# Patient Record
Sex: FEMALE | Race: WHITE | NOT HISPANIC OR LATINO | Employment: FULL TIME | URBAN - METROPOLITAN AREA
[De-identification: names, ages, dates, MRNs, and addresses within clinical notes are randomized per-mention and may not be internally consistent; named-entity substitution may affect disease eponyms.]

---

## 2017-04-27 ENCOUNTER — OFFICE VISIT (OUTPATIENT)
Dept: MEDICAL GROUP | Facility: LAB | Age: 22
End: 2017-04-27
Payer: COMMERCIAL

## 2017-04-27 VITALS
SYSTOLIC BLOOD PRESSURE: 112 MMHG | TEMPERATURE: 98.5 F | DIASTOLIC BLOOD PRESSURE: 74 MMHG | RESPIRATION RATE: 12 BRPM | OXYGEN SATURATION: 96 % | WEIGHT: 215 LBS | BODY MASS INDEX: 34.55 KG/M2 | HEART RATE: 80 BPM | HEIGHT: 66 IN

## 2017-04-27 DIAGNOSIS — N93.9 ABNORMAL UTERINE BLEEDING: ICD-10-CM

## 2017-04-27 DIAGNOSIS — F90.2 ATTENTION DEFICIT HYPERACTIVITY DISORDER (ADHD), COMBINED TYPE: ICD-10-CM

## 2017-04-27 DIAGNOSIS — F32.1 MODERATE SINGLE CURRENT EPISODE OF MAJOR DEPRESSIVE DISORDER (HCC): ICD-10-CM

## 2017-04-27 DIAGNOSIS — E66.9 OBESITY (BMI 30-39.9): ICD-10-CM

## 2017-04-27 PROCEDURE — 99215 OFFICE O/P EST HI 40 MIN: CPT | Performed by: FAMILY MEDICINE

## 2017-04-27 NOTE — PROGRESS NOTES
Subjective:   Jennifer Castellanos is a 21 y.o. female here today for   Chief Complaint   Patient presents with   • Depression   • ADHD       1. Moderate single current episode of major depressive disorder (CMS-HCC)  This is chronic. Patient's father passed doing 2015 and she has been struggling with this since. We saw her about 5 months ago and prescribed Prozac. She took this for about one month. She overall did not want to be on medication and thus stopped it. She did get tired on the medication. Since that time, she has started to Fail her classes because of this. She is oversleeping. She feels as though the world would be better off Without her. She denies any suicidal plans, but often on the freeway she will have thoughts of wishing somebody hit her so that she does not have to go to work. She has found herself catastrophizing small events in her life. She does relay that there are many things for her to live for including her boyfriend and mother and sister. She is still seeing her counselor regularly. She states that she feels safe    2. Attention deficit hyperactivity disorder (ADHD), combined type  This is chronic. Patient has a prescription for Concerta 36 mg. She has been on this for several years. She has had formal testing for this.    3. Obesity (BMI 30-39.9)  This is chronic. Patient has gained 5 pounds over the last 5 months. She is not exercising regularly due to lack of motivation.    4. Abnormal uterine bleeding  This is a new problem to discuss. She has heavy periods with cramping. She is interested in hormonal contraception. She has been on OCPs when she was 18 which caused severe spotting. She was only on these for 3 months. She would like something to help with her heavy periods. She is interested in IUD..        Current medicines (including changes today)  Current Outpatient Prescriptions   Medication Sig Dispense Refill   • sertraline (ZOLOFT) 50 MG Tab Take 1 Tab by mouth every day. 30 Tab  "5   • methylphenidate (CONCERTA) 36 MG CR tablet Take 1 Tab by mouth every morning. 30 Tab 2     No current facility-administered medications for this visit.     She  has a past medical history of Obesity (BMI 30-39.9).    ROS  No fevers  No bowel changes  No LE edema       Objective:     Blood pressure 112/74, pulse 80, temperature 36.9 °C (98.5 °F), resp. rate 12, height 1.676 m (5' 5.98\"), weight 97.523 kg (215 lb), SpO2 96 %. Body mass index is 34.72 kg/(m^2).   Physical Exam:  Constitutional: Alert, no distress.  Skin: Warm, dry, good turgor, no rashes in visible areas.  Eye: Equal, round and reactive, conjunctiva clear, lids normal.  ENMT: Lips without lesions, good dentition, oropharynx clear.  Neck: Trachea midline, no masses, no thyromegaly. No cervical or supraclavicular lymphadenopathy  Respiratory: Unlabored respiratory effort, lungs clear to auscultation, no wheezes, no ronchi.  Cardiovascular: Normal S1, S2, RRR, no murmur, no edema.  Abdomen: Soft, non-tender, no masses, no hepatosplenomegaly.  Psych: Alert and oriented x3, tearful.        Assessment and Plan:   The following treatment plan was discussed    1. Moderate single current episode of major depressive disorder (CMS-HCC)  Chronic, not controlled  Continue counseling  Start Exercise  Start Zoloft 25 mg for one week then increase to 50 mg   - sertraline (ZOLOFT) 50 MG Tab; Take 1 Tab by mouth every day.  Dispense: 30 Tab; Refill: 5    2. Attention deficit hyperactivity disorder (ADHD), combined type  Chronic, stable, continue Concerta    3. Obesity (BMI 30-39.9)  Chronic, stable, controlled  Hopefully if we treat her depression she motivation to exercise  - Patient identified as having weight management issue.  Appropriate orders and counseling given.    4. Abnormal uterine bleeding  Chronic, stable, patient would like IUD  We will schedule her for Kyleena    Followup: Return for Kyleena placement. and in 6 weeks for depression       This note " was created using voice recognition software. I have made every reasonable attempt to correct errors, however, I do anticipate some grammatical errors.     Total 40 minutes face-to-face time spent with patient not including procedure, with greater than 50% of the total time discussing patient's issues and symptoms as listed above in assessment and plan, as well as managing coordination of care for future evaluation and treatment.

## 2017-04-27 NOTE — Clinical Note
April 27, 2017        Jennifer Castellanos  [id number]  [facility] [address]        To whom it may concern:    Jennifer has been under my care since 11/2016. Due to a medical condition, it is medically necessary for her to withdraw from college courses.     If you have any questions or concerns, please don't hesitate to call.        Sincerely,        Aby Mccoy M.D.    Electronically Signed

## 2017-04-27 NOTE — MR AVS SNAPSHOT
"        Jennifer Castellanos   2017 7:20 AM   Office Visit   MRN: [id number]    Department:  [facility]   Dept Phone:  [phone]    Description:  Female : 1995   Provider:  Aby Mccoy M.D.           Reason for Visit     Depression     ADHD           Allergies as of 2017     No Known Allergies      You were diagnosed with     Moderate single current episode of major depressive disorder (CMS-HCC)   [2798119]       Attention deficit hyperactivity disorder (ADHD), combined type   [3146240]       Obesity (BMI 30-39.9)   [483497]         Vital Signs     Blood Pressure Pulse Temperature Respirations Height Weight    112/74 mmHg 80 36.9 °C (98.5 °F) 12 1.676 m (5' 5.98\") 97.523 kg (215 lb)    Body Mass Index Oxygen Saturation Smoking Status             34.72 kg/m2 96% Never Smoker          Basic Information     Date Of Birth Sex Race Ethnicity Preferred Language    1995 Female White Non- English      Your appointments     2017  7:00 AM   Established Patient with Aby Mccoy M.D.   [facility] (--)    [address] [id number] [phone]           You will be receiving a confirmation call a few days before your appointment from our automated call confirmation system.            2017 11:00 AM   Established Patient with Aby Mccoy M.D.   [facility] (--)    [address] [phone] [phone]           You will be receiving a confirmation call a few days before your appointment from our automated call confirmation system.              Problem List              ICD-10-CM Priority Class Noted - Resolved    Encounter for counseling regarding contraception Z30.9   10/21/2016 - Present    Moderate single current episode of major depressive disorder (CMS-HCC) F32.1   10/21/2016 - Present    Obesity (BMI 30-39.9) E66.9   10/21/2016 - " Present    Attention deficit hyperactivity disorder (ADHD), combined type F90.2   10/21/2016 - Present      Health Maintenance        Date Due Completion Dates    IMM HEP B VACCINE (1 of 3 - Primary Series) 1995 ---    IMM DTaP/Tdap/Td Vaccine (3 - Td) 7/8/2018 7/8/2008, 10/18/2001            Current Immunizations     Dtap Vaccine 10/18/2001    HPV Quadrivalent Vaccine (GARDASIL) 5/21/2013, 6/17/2011, 3/3/2010    Hepatitis A Vaccine, Ped/Adol 7/15/2003, 8/15/2002    IPV 10/18/2001    Influenza Vaccine Quad Inj (Preserved) 10/21/2016    MMR Vaccine 10/18/2001    Meningococcal Conjugate Vaccine MCV4 (Menveo) 1/23/2014, 7/8/2008    Meningococcal Vaccine Serogroup B (Trumenba) 8/18/2015    Tdap Vaccine 7/8/2008    Varicella Vaccine Live 6/17/2011, 8/13/2007      Below and/or attached are the medications your provider expects you to take. Review all of your home medications and newly ordered medications with your provider and/or pharmacist. Follow medication instructions as directed by your provider and/or pharmacist. Please keep your medication list with you and share with your provider. Update the information when medications are discontinued, doses are changed, or new medications (including over-the-counter products) are added; and carry medication information at all times in the event of emergency situations     Allergies:  No Known Allergies          Medications  Valid as of: April 27, 2017 -  8:11 AM    Generic Name Brand Name Tablet Size Instructions for use    Methylphenidate HCl (Tab CR) CONCERTA 36 MG Take 1 Tab by mouth every morning.        Sertraline HCl (Tab) ZOLOFT 50 MG Take 1 Tab by mouth every day.        .                 Medicines prescribed today were sent to:     [facility] [address] [place] [address], [place] - [address] [facility]    [address] [facility] [address]    Phone: [phone] Fax: [phone]    Open 24 Hours?: Yes      Medication refill instructions:       If  your prescription bottle indicates you have medication refills left, it is not necessary to call your provider’s office. Please contact your pharmacy and they will refill your medication.    If your prescription bottle indicates you do not have any refills left, you may request refills at any time through one of the following ways: The online [employer] system (except Urgent Care), by calling your provider’s office, or by asking your pharmacy to contact your provider’s office with a refill request. Medication refills are processed only during regular business hours and may not be available until the next business day. Your provider may request additional information or to have a follow-up visit with you prior to refilling your medication.   *Please Note: Medication refills are assigned a new Rx number when refilled electronically. Your pharmacy may indicate that no refills were authorized even though a new prescription for the same medication is available at the pharmacy. Please request the medicine by name with the pharmacy before contacting your provider for a refill.           [employer] Access Code: Activation code not generated  Current [employer] Status: Active

## 2017-04-28 ENCOUNTER — OFFICE VISIT (OUTPATIENT)
Dept: MEDICAL GROUP | Facility: LAB | Age: 22
End: 2017-04-28
Payer: COMMERCIAL

## 2017-04-28 ENCOUNTER — HOSPITAL ENCOUNTER (OUTPATIENT)
Facility: MEDICAL CENTER | Age: 22
End: 2017-04-28
Attending: FAMILY MEDICINE
Payer: COMMERCIAL

## 2017-04-28 VITALS
HEART RATE: 100 BPM | BODY MASS INDEX: 34.3 KG/M2 | WEIGHT: 213.41 LBS | TEMPERATURE: 97.8 F | RESPIRATION RATE: 16 BRPM | SYSTOLIC BLOOD PRESSURE: 96 MMHG | HEIGHT: 66 IN | DIASTOLIC BLOOD PRESSURE: 70 MMHG

## 2017-04-28 DIAGNOSIS — Z30.430 ENCOUNTER FOR IUD INSERTION: ICD-10-CM

## 2017-04-28 DIAGNOSIS — N94.89 UTERINE CRAMPING: ICD-10-CM

## 2017-04-28 LAB
INT CON NEG: NEGATIVE
INT CON POS: POSITIVE
POC URINE PREGNANCY TEST: NORMAL

## 2017-04-28 PROCEDURE — 58300 INSERT INTRAUTERINE DEVICE: CPT | Performed by: FAMILY MEDICINE

## 2017-04-28 PROCEDURE — 81025 URINE PREGNANCY TEST: CPT | Performed by: FAMILY MEDICINE

## 2017-04-28 RX ORDER — PROMETHAZINE HYDROCHLORIDE 25 MG/1
25 TABLET ORAL ONCE
Status: COMPLETED | OUTPATIENT
Start: 2017-04-28 | End: 2017-04-28

## 2017-04-28 RX ORDER — KETOROLAC TROMETHAMINE 30 MG/ML
60 INJECTION, SOLUTION INTRAMUSCULAR; INTRAVENOUS ONCE
Status: COMPLETED | OUTPATIENT
Start: 2017-04-28 | End: 2017-04-28

## 2017-04-28 RX ADMIN — PROMETHAZINE HYDROCHLORIDE 25 MG: 25 TABLET ORAL at 10:44

## 2017-04-28 RX ADMIN — KETOROLAC TROMETHAMINE 60 MG: 30 INJECTION, SOLUTION INTRAMUSCULAR; INTRAVENOUS at 10:44

## 2017-04-28 NOTE — PROGRESS NOTES
SUBJECTIVE:    Reviewed intake note: yes    Jennifer Castellanos is a  22 y.o. female who presents with the complaint of Abnormal uterine bleeding, need for contraception.    Consent:Counseled regarding use, risks, and benefits of IUD., Patient read and understands the consent booklet., Procedure explained., Signed informed consent obtained.    OBJECTIVE:    Objective results:    Pregnancy test negative, Urine GC/CT collected    abdomen is soft without significant tenderness, masses, organomegaly or guarding., no CVA tenderness., no guarding or rigidity., no hernia noted., no inguinal nodes., no rebound tenderness., pelvic exam: normal external genitalia, vulva, vagina, cervix, uterus and adnexa, nuliparous os, currently on menses    Uterus sounded to 5, Kyleena IUD inserted without difficulty, String visible and trimmed., Patient tolerated procedure but did experience cramping and nausea., for this Toradol 60mg and promethazine 25mg given. She improved after these measures.        Kyleena IUD 19.5mg levonorgestrel  NDC 63760-472-92  Lot [id number]  Exp: 08/18

## 2017-04-28 NOTE — MR AVS SNAPSHOT
"        Jennifer Castellanos   2017 9:20 AM   Office Visit   MRN: [id number]    Department:  [facility]   Dept Phone:  [phone]    Description:  Female : 1995   Provider:  Aby Mccoy M.D.           Allergies as of 2017     No Known Allergies      You were diagnosed with     Encounter for IUD insertion   [424410]         Vital Signs     Blood Pressure Pulse Temperature Respirations Height Weight    96/70 mmHg 100 36.6 °C (97.8 °F) 16 1.676 m (5' 5.98\") 96.8 kg (213 lb 6.5 oz)    Body Mass Index Smoking Status                34.46 kg/m2 Never Smoker           Basic Information     Date Of Birth Sex Race Ethnicity Preferred Language    1995 Female White Non- English      Your appointments     2017 11:00 AM   Established Patient with Aby Mccoy M.D.   [facility] (--)    [address] [phone]           You will be receiving a confirmation call a few days before your appointment from our automated call confirmation system.              Problem List              ICD-10-CM Priority Class Noted - Resolved    Encounter for counseling regarding contraception Z30.9   10/21/2016 - Present    Moderate single current episode of major depressive disorder (CMS-HCC) F32.1   10/21/2016 - Present    Obesity (BMI 30-39.9) E66.9   10/21/2016 - Present    Attention deficit hyperactivity disorder (ADHD), combined type F90.2   10/21/2016 - Present    Abnormal uterine bleeding N93.9   2017 - Present    Encounter for IUD insertion Z30.430   2017 - Present      Health Maintenance        Date Due Completion Dates    IMM HEP B VACCINE (1 of 3 - Primary Series) 1995 ---    IMM DTaP/Tdap/Td Vaccine (3 - Td) 2018, 10/18/2001            Results     POCT Pregnancy      Component Value Standard Range & Units    POC Urine Pregnancy Test NEG Negative    Internal Control Positive " Positive     Internal Control Negative Negative                         Current Immunizations     Dtap Vaccine 10/18/2001    HPV Quadrivalent Vaccine (GARDASIL) 5/21/2013, 6/17/2011, 3/3/2010    Hepatitis A Vaccine, Ped/Adol 7/15/2003, 8/15/2002    IPV 10/18/2001    Influenza Vaccine Quad Inj (Preserved) 10/21/2016    MMR Vaccine 10/18/2001    Meningococcal Conjugate Vaccine MCV4 (Menveo) 1/23/2014, 7/8/2008    Meningococcal Vaccine Serogroup B (Trumenba) 8/18/2015    Tdap Vaccine 7/8/2008    Varicella Vaccine Live 6/17/2011, 8/13/2007      Below and/or attached are the medications your provider expects you to take. Review all of your home medications and newly ordered medications with your provider and/or pharmacist. Follow medication instructions as directed by your provider and/or pharmacist. Please keep your medication list with you and share with your provider. Update the information when medications are discontinued, doses are changed, or new medications (including over-the-counter products) are added; and carry medication information at all times in the event of emergency situations     Allergies:  No Known Allergies          Medications  Valid as of: April 28, 2017 -  9:59 AM    Generic Name Brand Name Tablet Size Instructions for use    Levonorgestrel (IUD) Levonorgestrel 19.5 MG by Intrauterine route.        Methylphenidate HCl (Tab CR) CONCERTA 36 MG Take 1 Tab by mouth every morning.        Sertraline HCl (Tab) ZOLOFT 50 MG Take 1 Tab by mouth every day.        .                 Medicines prescribed today were sent to:     [facility] [address] [place] [address] - [facility] [address] [place] [address] [address] [facility] [address]    Phone: [phone] Fax: [phone]    Open 24 Hours?: Yes      Medication refill instructions:       If your prescription bottle indicates you have medication refills left, it is not necessary to call your provider’s office. Please contact your pharmacy and  they will refill your medication.    If your prescription bottle indicates you do not have any refills left, you may request refills at any time through one of the following ways: The online [employer] system (except Urgent Care), by calling your provider’s office, or by asking your pharmacy to contact your provider’s office with a refill request. Medication refills are processed only during regular business hours and may not be available until the next business day. Your provider may request additional information or to have a follow-up visit with you prior to refilling your medication.   *Please Note: Medication refills are assigned a new Rx number when refilled electronically. Your pharmacy may indicate that no refills were authorized even though a new prescription for the same medication is available at the pharmacy. Please request the medicine by name with the pharmacy before contacting your provider for a refill.        Your To Do List     Future Labs/Procedures Complete By Expires    CHLAMYDIA/GC PCR URINE OR SWAB  As directed 4/29/2018    IUD INSERTION  As directed 4/28/2018         [employer] Access Code: Activation code not generated  Current [employer] Status: Active

## 2017-04-28 NOTE — Clinical Note
April 28, 2017         Patient: Jennifer Castellanos   YOB: 1995   Date of Visit: 4/28/2017           To Whom it May Concern:    Jennifer Castellanos was seen in my clinic on 4/28/2017. Please excuse her for 4/28. She may return to work on 4/29/17..    If you have any questions or concerns, please don't hesitate to call.        Sincerely,           Aby Mccoy M.D.  Electronically Signed

## 2017-06-15 ENCOUNTER — OFFICE VISIT (OUTPATIENT)
Dept: MEDICAL GROUP | Facility: LAB | Age: 22
End: 2017-06-15
Payer: COMMERCIAL

## 2017-06-15 VITALS
HEIGHT: 66 IN | BODY MASS INDEX: 35.64 KG/M2 | DIASTOLIC BLOOD PRESSURE: 72 MMHG | OXYGEN SATURATION: 98 % | SYSTOLIC BLOOD PRESSURE: 90 MMHG | TEMPERATURE: 98.1 F | HEART RATE: 68 BPM | WEIGHT: 221.78 LBS | RESPIRATION RATE: 16 BRPM

## 2017-06-15 DIAGNOSIS — Z30.431 IUD CHECK UP: ICD-10-CM

## 2017-06-15 DIAGNOSIS — N93.9 ABNORMAL UTERINE BLEEDING: ICD-10-CM

## 2017-06-15 DIAGNOSIS — E66.9 OBESITY (BMI 30-39.9): ICD-10-CM

## 2017-06-15 DIAGNOSIS — F32.1 MODERATE SINGLE CURRENT EPISODE OF MAJOR DEPRESSIVE DISORDER (HCC): ICD-10-CM

## 2017-06-15 PROBLEM — Z30.430 ENCOUNTER FOR IUD INSERTION: Status: RESOLVED | Noted: 2017-04-28 | Resolved: 2017-06-15

## 2017-06-15 PROCEDURE — 99214 OFFICE O/P EST MOD 30 MIN: CPT | Performed by: FAMILY MEDICINE

## 2017-06-15 NOTE — MR AVS SNAPSHOT
"        Jennifer Castellanos   6/15/2017 8:40 AM   Office Visit   MRN: [id number]    Department:  [facility]   Dept Phone:  [phone]    Description:  Female : 1995   Provider:  Aby Mccoy M.D.           Reason for Visit     Follow-Up IUD CHECK       Allergies as of 6/15/2017     No Known Allergies      You were diagnosed with     Moderate single current episode of major depressive disorder (CMS-HCC)   [7641291]       Abnormal uterine bleeding   [014483]       Obesity (BMI 30-39.9)   [499919]         Vital Signs     Blood Pressure Pulse Temperature Respirations Height Weight    90/72 mmHg 68 36.7 °C (98.1 °F) 16 1.676 m (5' 5.98\") 100.6 kg (221 lb 12.5 oz)    Body Mass Index Oxygen Saturation Smoking Status             35.81 kg/m2 98% Never Smoker          Basic Information     Date Of Birth Sex Race Ethnicity Preferred Language    1995 Female White Non- English      Your appointments     Dec 15, 2017  9:00 AM   Established Patient with Aby Mccoy M.D.   [facility] (--)    [address] [address] [phone]           You will be receiving a confirmation call a few days before your appointment from our automated call confirmation system.              Problem List              ICD-10-CM Priority Class Noted - Resolved    Moderate single current episode of major depressive disorder (CMS-[facility]) F32.1   10/21/2016 - Present    Obesity (BMI 30-39.9) E66.9   10/21/2016 - Present    Attention deficit hyperactivity disorder (ADHD), combined type F90.2   10/21/2016 - Present    Abnormal uterine bleeding N93.9   2017 - Present      Health Maintenance        Date Due Completion Dates    IMM HEP B VACCINE (1 of 3 - Primary Series) 1995 ---    IMM DTaP/Tdap/Td Vaccine (3 - Td) 2018, 10/18/2001            Current Immunizations     Dtap Vaccine 10/18/2001    HPV Quadrivalent Vaccine (GARDASIL) " 5/21/2013, 6/17/2011, 3/3/2010    Hepatitis A Vaccine, Ped/Adol 7/15/2003, 8/15/2002    IPV 10/18/2001    Influenza Vaccine Quad Inj (Preserved) 10/21/2016    MMR Vaccine 10/18/2001    Meningococcal Conjugate Vaccine MCV4 (Menveo) 1/23/2014, 7/8/2008    Meningococcal Vaccine Serogroup B (Trumenba) 8/18/2015    Tdap Vaccine 7/8/2008    Varicella Vaccine Live 6/17/2011, 8/13/2007      Below and/or attached are the medications your provider expects you to take. Review all of your home medications and newly ordered medications with your provider and/or pharmacist. Follow medication instructions as directed by your provider and/or pharmacist. Please keep your medication list with you and share with your provider. Update the information when medications are discontinued, doses are changed, or new medications (including over-the-counter products) are added; and carry medication information at all times in the event of emergency situations     Allergies:  No Known Allergies          Medications  Valid as of: Tamara 15, 2017 -  9:10 AM    Generic Name Brand Name Tablet Size Instructions for use    Levonorgestrel (IUD) Levonorgestrel 19.5 MG by Intrauterine route.        Methylphenidate HCl (Tab CR) CONCERTA 36 MG Take 1 Tab by mouth every morning.        Sertraline HCl (Tab) ZOLOFT 50 MG Take 1 Tab by mouth every day.        .                 Medicines prescribed today were sent to:     [facility] [address] [place] [address] [address] [facility] & [place] [address] [address] [facility] [address]    Phone: [phone] Fax: [phone]    Open 24 Hours?: Yes      Medication refill instructions:       If your prescription bottle indicates you have medication refills left, it is not necessary to call your provider’s office. Please contact your pharmacy and they will refill your medication.    If your prescription bottle indicates you do not have any refills left, you may request refills at any time through one of  the following ways: The online [employer] system (except Urgent Care), by calling your provider’s office, or by asking your pharmacy to contact your provider’s office with a refill request. Medication refills are processed only during regular business hours and may not be available until the next business day. Your provider may request additional information or to have a follow-up visit with you prior to refilling your medication.   *Please Note: Medication refills are assigned a new Rx number when refilled electronically. Your pharmacy may indicate that no refills were authorized even though a new prescription for the same medication is available at the pharmacy. Please request the medicine by name with the pharmacy before contacting your provider for a refill.           [employer] Access Code: Activation code not generated  Current [employer] Status: Active

## 2017-06-15 NOTE — PROGRESS NOTES
"Subjective:   Jennifer Castellanos is a 22 y.o. female here today for   Chief Complaint   Patient presents with   • Follow-Up     IUD CHECK        1. Moderate single current episode of major depressive disorder (CMS-HCC)  Chronic. At the last visit, started zoloft 50mg daily. She is feeling much better. She states it is much easier to leave her apartment. She is less anxious. She is feeling better about herself. She denies any side effects from this medication. She is not currently in school and so she is anxious to see how the medications work when she restarts school back in the fall    2. Abnormal uterine bleeding/IUD Checkup  Kyleena inserted 6 weeks ago. Did have about 1 month of uterine cramping. She did have 1 period that was very heavy and painful. She is overall feeling much better. She is spotting currently.     3. Obesity (BMI 30-39.9)  Chronic. Has gained 8lbs over the last 6 weeks. She stopped going to the gym because this is how she was relieving her anxiety. Now she does not need to relieve her anxiety so she stopped going.    Current medicines (including changes today)  Current Outpatient Prescriptions   Medication Sig Dispense Refill   • sertraline (ZOLOFT) 50 MG Tab Take 1 Tab by mouth every day. 90 Tab 3   • Levonorgestrel (KYLEENA) 19.5 MG IUD by Intrauterine route.     • methylphenidate (CONCERTA) 36 MG CR tablet Take 1 Tab by mouth every morning. 30 Tab 2     No current facility-administered medications for this visit.     She  has a past medical history of Obesity (BMI 30-39.9).    ROS   No fevers  No bowel changes  No LE edema       Objective:     Blood pressure 90/72, pulse 68, temperature 36.7 °C (98.1 °F), resp. rate 16, height 1.676 m (5' 5.98\"), weight 100.6 kg (221 lb 12.5 oz), SpO2 98 %. Body mass index is 35.81 kg/(m^2).   Physical Exam:  Constitutional: Alert, no distress.  Skin: Warm, dry, good turgor, no rashes in visible areas.  Eye: Equal, round and reactive, conjunctiva clear, " lids normal.  ENMT: Lips without lesions, good dentition, oropharynx clear.  Neck: Trachea midline, no masses, no thyromegaly. No cervical or supraclavicular lymphadenopathy  Respiratory: Unlabored respiratory effort, lungs clear to auscultation, no wheezes, no ronchi.  Cardiovascular: Normal S1, S2, RRR, no murmur, no edema.  Abdomen: Soft, non-tender, no masses, no hepatosplenomegaly.  Psych: Alert and oriented x3, normal affect and mood.  : Speculum exam vaginal mucosa, mild vaginal bleeding from the cervical os, strings visualized at the office. Bimanual exam without any cervical motion tenderness    Assessment and Plan:   The following treatment plan was discussed    1. Moderate single current episode of major depressive disorder (CMS-HCC)  Chronic, stable  Continue Zoloft 50 mg daily  Discussed counseling again, patient will look into this  Follow up every 6 months    2. Abnormal uterine bleeding/IUD checkup  Chronic, stable  Strings visualized today  Discussed normal course of regulating periods with Kyleena IUD   Return yearly, sooner if any concerns    3. Obesity (BMI 30-39.9)  Chronic, worsening, discussed importance of dietary modifications and exercise      Followup: Return in about 6 months (around 12/15/2017) for depression .       This note was created using voice recognition software. I have made every reasonable attempt to correct errors, however, I do anticipate some grammatical errors.

## 2018-01-16 ENCOUNTER — APPOINTMENT (OUTPATIENT)
Dept: MEDICAL GROUP | Facility: LAB | Age: 23
End: 2018-01-16
Payer: COMMERCIAL

## 2019-03-27 ENCOUNTER — HOSPITAL ENCOUNTER (OUTPATIENT)
Facility: MEDICAL CENTER | Age: 24
End: 2019-03-27
Attending: FAMILY MEDICINE
Payer: COMMERCIAL

## 2019-03-27 ENCOUNTER — OFFICE VISIT (OUTPATIENT)
Dept: MEDICAL GROUP | Facility: LAB | Age: 24
End: 2019-03-27
Payer: COMMERCIAL

## 2019-03-27 ENCOUNTER — TELEPHONE (OUTPATIENT)
Dept: MEDICAL GROUP | Facility: LAB | Age: 24
End: 2019-03-27

## 2019-03-27 VITALS
TEMPERATURE: 98.1 F | HEIGHT: 67 IN | RESPIRATION RATE: 16 BRPM | SYSTOLIC BLOOD PRESSURE: 118 MMHG | HEART RATE: 95 BPM | DIASTOLIC BLOOD PRESSURE: 72 MMHG | BODY MASS INDEX: 33.09 KG/M2 | OXYGEN SATURATION: 96 % | WEIGHT: 210.8 LBS

## 2019-03-27 DIAGNOSIS — Z12.4 SCREENING FOR MALIGNANT NEOPLASM OF CERVIX: ICD-10-CM

## 2019-03-27 DIAGNOSIS — F32.1 MODERATE SINGLE CURRENT EPISODE OF MAJOR DEPRESSIVE DISORDER (HCC): ICD-10-CM

## 2019-03-27 DIAGNOSIS — D36.7 CYST, DERMOID, ARM, RIGHT: ICD-10-CM

## 2019-03-27 DIAGNOSIS — Z00.00 HEALTH MAINTENANCE EXAMINATION: ICD-10-CM

## 2019-03-27 DIAGNOSIS — F41.9 ANXIETY: ICD-10-CM

## 2019-03-27 DIAGNOSIS — E66.9 OBESITY (BMI 30-39.9): ICD-10-CM

## 2019-03-27 DIAGNOSIS — Z11.51 SCREENING FOR HPV (HUMAN PAPILLOMAVIRUS): ICD-10-CM

## 2019-03-27 DIAGNOSIS — Z11.3 SCREEN FOR STD (SEXUALLY TRANSMITTED DISEASE): ICD-10-CM

## 2019-03-27 DIAGNOSIS — Z01.419 WELL WOMAN EXAM WITH ROUTINE GYNECOLOGICAL EXAM: ICD-10-CM

## 2019-03-27 DIAGNOSIS — Z13.220 ENCOUNTER FOR LIPID SCREENING FOR CARDIOVASCULAR DISEASE: ICD-10-CM

## 2019-03-27 DIAGNOSIS — Z13.6 ENCOUNTER FOR LIPID SCREENING FOR CARDIOVASCULAR DISEASE: ICD-10-CM

## 2019-03-27 DIAGNOSIS — N60.02 CYST OF LEFT BREAST: ICD-10-CM

## 2019-03-27 PROCEDURE — 99214 OFFICE O/P EST MOD 30 MIN: CPT | Mod: 25 | Performed by: FAMILY MEDICINE

## 2019-03-27 PROCEDURE — 99395 PREV VISIT EST AGE 18-39: CPT | Performed by: FAMILY MEDICINE

## 2019-03-27 PROCEDURE — 99000 SPECIMEN HANDLING OFFICE-LAB: CPT | Performed by: FAMILY MEDICINE

## 2019-03-27 RX ORDER — CITALOPRAM 20 MG/1
TABLET ORAL
Qty: 30 TAB | Refills: 5 | Status: SHIPPED | OUTPATIENT
Start: 2019-03-27 | End: 2019-11-15 | Stop reason: SDUPTHER

## 2019-03-27 ASSESSMENT — PATIENT HEALTH QUESTIONNAIRE - PHQ9
SUM OF ALL RESPONSES TO PHQ9 QUESTIONS 1 AND 2: 3
9. THOUGHTS THAT YOU WOULD BE BETTER OFF DEAD, OR OF HURTING YOURSELF: NOT AT ALL
SUM OF ALL RESPONSES TO PHQ QUESTIONS 1-9: 11
5. POOR APPETITE OR OVEREATING: NOT AT ALL
1. LITTLE INTEREST OR PLEASURE IN DOING THINGS: SEVERAL DAYS
6. FEELING BAD ABOUT YOURSELF - OR THAT YOU ARE A FAILURE OR HAVE LET YOURSELF OR YOUR FAMILY DOWN: MORE THAN HALF THE DAYS
7. TROUBLE CONCENTRATING ON THINGS, SUCH AS READING THE NEWSPAPER OR WATCHING TELEVISION: MORE THAN HALF THE DAYS
3. TROUBLE FALLING OR STAYING ASLEEP OR SLEEPING TOO MUCH: MORE THAN HALF THE DAYS
8. MOVING OR SPEAKING SO SLOWLY THAT OTHER PEOPLE COULD HAVE NOTICED. OR THE OPPOSITE, BEING SO FIGETY OR RESTLESS THAT YOU HAVE BEEN MOVING AROUND A LOT MORE THAN USUAL: NOT AT ALL
2. FEELING DOWN, DEPRESSED, IRRITABLE, OR HOPELESS: MORE THAN HALF THE DAYS
4. FEELING TIRED OR HAVING LITTLE ENERGY: MORE THAN HALF THE DAYS

## 2019-03-27 NOTE — TELEPHONE ENCOUNTER
[facility] [phone]  pharmacy needs clarification, usually a person only takes one of these medications. You sent in both.   citalopram (CELEXA) 20 MG Tab and sertraline (ZOLOFT) 50 MG Tab   Is pt taking both?

## 2019-03-28 LAB
FORWARD REASON: SPWHY: NORMAL
FORWARDED TO LAB: SPWHR: NORMAL
SPECIMEN SENT: SPWT1: NORMAL

## 2019-03-28 NOTE — PROGRESS NOTES
Subjective:   Jennifer Castellanos is a 23 y.o. female here today for   Chief Complaint   Patient presents with   • Gynecologic Exam     1. Well woman exam with routine gynecological exam  2. Screening for malignant neoplasm of cervix  3. Screening for HPV (human papillomavirus)  Patient is here today for her first Pap smear.  She is using the KylLeena IUD for birth control.  She is currently sexually active with one partner for the last 8 months, male, not using regular protection.  She has never been screened a full panel of STDs.  She has had gonorrhea and chlamydia testing in the past.  She is staying active, believes she is eating a healthy diet.    4. Cyst, dermoid, arm, right  This is a new problem to discuss with me today.  Patient reports an area on the right arm that is been growing, slightly tender, and feels nodular under the skin.  No draining.  No current fevers, chills.  Not currently tender but it has been in the past.  She states that it can double in size and then go back down    5. Moderate single current episode of major depressive disorder (HCC)  6. Anxiety  Chronic.  Patient has been on Zoloft in the past.  She was on this for the last couple of years but stopped this about 3 months ago.  She started using cannabis instead.  She states that this helps her sleep, but has noticed that her anxiety has much worsened.  Depression symptoms are stable off of the medication.  No suicidal ideation.  Energy levels are lower.  She states that when she was on the Zoloft, she overall felt a lot less depressed but also had a difficult time regulating her emotions    7. Obesity (BMI 30-39.9)  This is chronic.  States that she is active regularly.  Diet overall healthy    8. Cyst of left breast  New to discuss.  Patient reports a lesion superior to the left nipple that drains a milky or clear fluid occasionally.  This is been going on for 6 months.  It is a linear.  She drained it last night.  She does feel  "nodule underneath it when it is full.        Current medicines (including changes today)  Current Outpatient Prescriptions   Medication Sig Dispense Refill   • citalopram (CELEXA) 20 MG Tab Take 1/2 tab PO Daily for 5 days then increase to the full tab daily 30 Tab 5   • Levonorgestrel (KYLEENA) 19.5 MG IUD by Intrauterine route.     • methylphenidate (CONCERTA) 36 MG CR tablet Take 1 Tab by mouth every morning. 30 Tab 2     No current facility-administered medications for this visit.      She  has a past medical history of Anxiety (3/27/2019) and Obesity (BMI 30-39.9).    ROS   No fevers  No bowel changes  No LE edema       Objective:     Blood pressure 118/72, pulse 95, temperature 36.7 °C (98.1 °F), temperature source Temporal, resp. rate 16, height 1.689 m (5' 6.5\"), weight 95.6 kg (210 lb 12.8 oz), last menstrual period 03/11/2019, SpO2 96 %. Body mass index is 33.51 kg/m².   Physical Exam:  Constitutional: Alert, no distress.  Skin: Warm, dry, good turgor, no rashes in visible areas. 1cm firm nodule in the right arm without fluctuance   Eye: Equal, round and reactive, conjunctiva clear, lids normal.  ENMT: Lips without lesions, good dentition, oropharynx clear.  Neck: Trachea midline, no masses, no thyromegaly. No cervical or supraclavicular lymphadenopathy  Respiratory: Unlabored respiratory effort, lungs clear to auscultation, no wheezes, no ronchi.  Cardiovascular: Normal S1, S2, RRR, no murmur, no edema.  Abdomen: Soft, non-tender, no masses, no hepatosplenomegaly.  Psych: Alert and oriented x3, normal affect and mood.  PELVIC:Perineum and external genitalia normal without rash. Vagina with normal and physiologic discharge. Cervix without visible lesions or discharge. Bimanual exam without adnexal masses or cervical motion tenderness. IUD strings visualized  BREAST EXAM: No skin changes, peau d'orange or nipple retraction. No discharge. No axillary or supraclavicular adenopathy. Linear 1cm firm mobile " "lesion on the left areola, unable to express discharge        Assessment and Plan:   The following treatment plan was discussed    1. Well woman exam with routine gynecological exam  2. Screening for malignant neoplasm of cervix  3. Screening for HPV (human papillomavirus)  - THINPREP RFLX HPV ASCUS W/CTNG; Future    4. Cyst, dermoid, arm, right  This is a new problem, suspect cyst given the fluctuating size.  At this point if it is not bothering her we will leave it.  If it continues then we will have her come back for excision have her see surgery  - PANEL [id number]  - RPR  - CBC WITH DIFFERENTIAL; Future  - Comp Metabolic Panel; Future  - Lipid Profile; Future  - TSH WITH REFLEX TO FT4; Future    5. Moderate single current episode of major depressive disorder (HCC)  Chronic, uncontrolled.  Discussed risks of marijuana use in depression and anxiety.  She is currently self-medicating with that.  She was having a \"numb\" feeling from Zoloft.  We are going to change to Celexa.  If no improvement then we will consider SNRI.  Discussed counseling and she is going to look into this with her insurance.  She was doing counseling at the [facility] previously  - citalopram (CELEXA) 20 MG Tab; Take 1/2 tab PO Daily for 5 days then increase to the full tab daily  Dispense: 30 Tab; Refill: 5  - PANEL [id number]  - RPR  - CBC WITH DIFFERENTIAL; Future  - Comp Metabolic Panel; Future  - Lipid Profile; Future  - TSH WITH REFLEX TO FT4; Future    6. Anxiety  Chronic, controlled, see #5  - citalopram (CELEXA) 20 MG Tab; Take 1/2 tab PO Daily for 5 days then increase to the full tab daily  Dispense: 30 Tab; Refill: 5  - PANEL [id number]  - RPR  - CBC WITH DIFFERENTIAL; Future  - Comp Metabolic Panel; Future  - Lipid Profile; Future  - TSH WITH REFLEX TO FT4; Future    7. Obesity (BMI 30-39.9)  Chronic, weight is stable.  Discussed vigorous exercise, healthy diet  - Patient identified as having weight management issue.  Appropriate orders and " counseling given.  - PANEL [id number]  - RPR  - CBC WITH DIFFERENTIAL; Future  - Comp Metabolic Panel; Future  - Lipid Profile; Future  - TSH WITH REFLEX TO FT4; Future    8. Cyst of left breast  This is a new problem, ultrasound with mammography ordered for further investigation  - MA-UNILAT DIAGNOSTIC MAMMO W/ CAD LEFT; Future    9. Screen for STD (sexually transmitted disease)  - PANEL [id number]  - RPR  - CBC WITH DIFFERENTIAL; Future  - Comp Metabolic Panel; Future  - Lipid Profile; Future  - TSH WITH REFLEX TO FT4; Future    10. Encounter for lipid screening for cardiovascular disease  - PANEL [id number]  - RPR  - CBC WITH DIFFERENTIAL; Future  - Comp Metabolic Panel; Future  - Lipid Profile; Future  - TSH WITH REFLEX TO FT4; Future    11. Health maintenance examination  - PANEL [id number]  - RPR  - CBC WITH DIFFERENTIAL; Future  - Comp Metabolic Panel; Future  - Lipid Profile; Future  - TSH WITH REFLEX TO FT4; Future      Followup: Return in about 6 weeks (around 5/8/2019) for depression, anxiety .       This note was created using voice recognition software. I have made every reasonable attempt to correct errors, however, I do anticipate some grammatical errors.

## 2019-11-15 DIAGNOSIS — F41.9 ANXIETY: ICD-10-CM

## 2019-11-15 DIAGNOSIS — F32.1 MODERATE SINGLE CURRENT EPISODE OF MAJOR DEPRESSIVE DISORDER (HCC): ICD-10-CM

## 2019-11-15 RX ORDER — CITALOPRAM 20 MG/1
TABLET ORAL
Qty: 30 TAB | Refills: 3 | Status: SHIPPED | OUTPATIENT
Start: 2019-11-15 | End: 2020-04-09

## 2019-11-15 NOTE — TELEPHONE ENCOUNTER
Was the patient seen in the last year in this department? Yes  3/27/19  Does patient have an active prescription for medications requested? No     Received Request Via: Pharmacy

## 2020-04-09 DIAGNOSIS — F32.1 MODERATE SINGLE CURRENT EPISODE OF MAJOR DEPRESSIVE DISORDER (HCC): ICD-10-CM

## 2020-04-09 DIAGNOSIS — F41.9 ANXIETY: ICD-10-CM

## 2020-04-09 RX ORDER — CITALOPRAM 20 MG/1
TABLET ORAL
Qty: 30 TAB | Refills: 0 | Status: SHIPPED | OUTPATIENT
Start: 2020-04-09 | End: 2020-04-10 | Stop reason: SDUPTHER

## 2020-04-09 NOTE — TELEPHONE ENCOUNTER
Received request via: Patient    Was the patient seen in the last year in this department? No LOV 3/27/19 I HAVE A RX QUED UP AS A 30 DAY SUPPLY. I'M ASKING PATIENT TO REESTABLISH FOR FUTURE REFILLS.    Does the patient have an active prescription (recently filled or refills available) for medication(s) requested? No

## 2020-04-10 ENCOUNTER — TELEMEDICINE (OUTPATIENT)
Dept: MEDICAL GROUP | Facility: LAB | Age: 25
End: 2020-04-10
Payer: COMMERCIAL

## 2020-04-10 VITALS — HEART RATE: 76 BPM | BODY MASS INDEX: 29.89 KG/M2 | HEIGHT: 66 IN | RESPIRATION RATE: 12 BRPM | WEIGHT: 186 LBS

## 2020-04-10 DIAGNOSIS — F32.1 MODERATE SINGLE CURRENT EPISODE OF MAJOR DEPRESSIVE DISORDER (HCC): ICD-10-CM

## 2020-04-10 DIAGNOSIS — F90.2 ATTENTION DEFICIT HYPERACTIVITY DISORDER (ADHD), COMBINED TYPE: ICD-10-CM

## 2020-04-10 DIAGNOSIS — F41.9 ANXIETY: ICD-10-CM

## 2020-04-10 PROCEDURE — 99213 OFFICE O/P EST LOW 20 MIN: CPT | Mod: CR,95 | Performed by: FAMILY MEDICINE

## 2020-04-10 RX ORDER — CITALOPRAM 20 MG/1
20 TABLET ORAL DAILY
Qty: 90 TAB | Refills: 3 | Status: SHIPPED | OUTPATIENT
Start: 2020-04-10 | End: 2021-04-12

## 2020-04-10 NOTE — PROGRESS NOTES
Telemedicine Visit: New Patient     This encounter was conducted via Zoom .   Verbal consent was obtained. Patient's identity was verified.    Subjective:     CC: Med refills    Jennifer Castellanos is a 24 y.o. female presenting to establish care and to discuss the evaluation and management of    Anxiety and depression  Chronic issues, stable.  Has been on stable dose of Celexa for 1 year.  No concerns with mood and feels as well controlled.  No thoughts of self-harm    ADHD  Chronic issue.  Had previously been on Concerta when she was going to school.  Has been off of this for 3 years that she has not been going to school.  She is going back to school is following up to restart.  Reports she did tolerate Concerta well in the past.  Did notice minor issue with sleeping but this improved with taking in the early morning.  No issues with appetite or agitation.    ROS  See HPI  Constitutional: Negative for fever, chills and malaise/fatigue.   HENT: Negative for congestion.    Eyes: Negative for pain.   Respiratory: Negative for cough and shortness of breath.    Cardiovascular: Negative for leg swelling.   Gastrointestinal: Negative for nausea, vomiting, abdominal pain and diarrhea.   Genitourinary: Negative for dysuria and hematuria.   Skin: Negative for rash.   Neurological: Negative for dizziness, focal weakness and headaches.   Endo/Heme/Allergies: Does not bruise/bleed easily.   Psychiatric/Behavioral: Negative for depression.  The patient is not nervous/anxious.      No Known Allergies    Current medicines (including changes today)  Current Outpatient Medications   Medication Sig Dispense Refill   • citalopram (CELEXA) 20 MG Tab Take 1 Tab by mouth every day. 90 Tab 3   • Levonorgestrel (KYLEENA) 19.5 MG IUD by Intrauterine route.     • methylphenidate (CONCERTA) 36 MG CR tablet Take 1 Tab by mouth every morning. (Patient not taking: Reported on 4/10/2020) 30 Tab 2     No current facility-administered medications  "for this visit.        She  has a past medical history of Anxiety (3/27/2019) and Obesity (BMI 30-39.9).  She  has no past surgical history on file.      Family History   Problem Relation Age of Onset   • Heart Disease Father    • Diabetes Maternal Grandmother    • Heart Disease Maternal Grandmother    • Hypertension Maternal Grandmother    • Heart Disease Maternal Grandfather      Family Status   Relation Name Status   • Mo  Alive   • Fa     • MGMo  (Not Specified)   • MGFa  (Not Specified)       Patient Active Problem List    Diagnosis Date Noted   • Anxiety 2019   • Abnormal uterine bleeding 2017   • Moderate single current episode of major depressive disorder (HCC) 10/21/2016   • Obesity (BMI 30-39.9) 10/21/2016   • Attention deficit hyperactivity disorder (ADHD), combined type 10/21/2016          Objective:   Vitals obtained by patient:  Pulse 76   Resp 12   Ht 1.676 m (5' 6\")   Wt 84.4 kg (186 lb)   BMI 30.02 kg/m²     Physical Exam:  Constitutional: Alert, no distress, well-groomed.  Skin: No rashes in visible areas.  Eye: Round. Conjunctiva clear, lids normal. No icterus.   ENMT: Lips pink without lesions, good dentition, moist mucous membranes. Phonation normal.  Neck: No masses, no thyromegaly. Moves freely without pain.  CV: Pulse as reported by patient  Respiratory: Unlabored respiratory effort, no cough or audible wheeze  Psych: Alert and oriented x3, normal affect and mood.       Assessment and Plan:   The following treatment plan was discussed:     1. Anxiety  2. Moderate single current episode of major depressive disorder (HCC)  Chronic, stable.  Currently well controlled.  Refilled Celexa  - citalopram (CELEXA) 20 MG Tab; Take 1 Tab by mouth every day.  Dispense: 90 Tab; Refill: 3    3. Attention deficit hyperactivity disorder (ADHD), combined type  Chronic issue. Has been off of Concerta for 3 years that she stopped going to school.  She is restarting school in the fall with " restarting Concerta.  Did tolerate as well in the past.  Collect UDS and contract and plan to restart this again prior to her starting school.    Follow-up: Return in about 3 months (around 7/10/2020).

## 2021-04-12 ENCOUNTER — TELEPHONE (OUTPATIENT)
Dept: MEDICAL GROUP | Facility: LAB | Age: 26
End: 2021-04-12

## 2022-04-20 ENCOUNTER — TELEPHONE (OUTPATIENT)
Dept: HEALTH INFORMATION MANAGEMENT | Facility: OTHER | Age: 27
End: 2022-04-20

## 2022-04-20 DIAGNOSIS — Z97.5 IUD (INTRAUTERINE DEVICE) IN PLACE: ICD-10-CM

## 2022-04-20 NOTE — TELEPHONE ENCOUNTER
1. Caller Name: Jennifer Castellanos                          Call Back Number: [phone] (home)         How would the patient prefer to be contacted with a response: [employer] message    2. SPECIFIC Action To Be Taken: Referral pending, please sign.    3. Diagnosis/Clinical Reason for Request: IUD replacement     4. Specialty & Provider Name/Lab/Imaging Location: [facility]    5. Is appointment scheduled for requested order/referral: yes - 4.28.2022    Patient was not informed they will receive a return phone call from the office ONLY if there are any questions before processing their request. Advised to call back if they haven't received a call from the referral department in 5 days.         MY Chart Message:    Jennifer Castellanos would like to request a referral.  Reason: Pap Smear / IUD Replacement  [facility]ed provider: [facility]  Comment:  Hi there, I have Kyleena as my IUD and my 5yrs are up on April 28th. I’m trying to arrange an appointment to replace it. Also, my previous provider went on maternity leave before I could receive a pap smear.

## 2022-07-17 ENCOUNTER — APPOINTMENT (OUTPATIENT)
Dept: URGENT CARE | Facility: CLINIC | Age: 27
End: 2022-07-17

## 2022-08-30 ENCOUNTER — APPOINTMENT (OUTPATIENT)
Dept: MEDICAL GROUP | Facility: LAB | Age: 27
End: 2022-08-30

## 2022-08-30 SDOH — HEALTH STABILITY: PHYSICAL HEALTH: ON AVERAGE, HOW MANY MINUTES DO YOU ENGAGE IN EXERCISE AT THIS LEVEL?: 30 MIN

## 2022-08-30 SDOH — ECONOMIC STABILITY: INCOME INSECURITY: IN THE LAST 12 MONTHS, WAS THERE A TIME WHEN YOU WERE NOT ABLE TO PAY THE MORTGAGE OR RENT ON TIME?: NO

## 2022-08-30 SDOH — ECONOMIC STABILITY: TRANSPORTATION INSECURITY
IN THE PAST 12 MONTHS, HAS THE LACK OF TRANSPORTATION KEPT YOU FROM MEDICAL APPOINTMENTS OR FROM GETTING MEDICATIONS?: NO

## 2022-08-30 SDOH — ECONOMIC STABILITY: HOUSING INSECURITY
IN THE LAST 12 MONTHS, WAS THERE A TIME WHEN YOU DID NOT HAVE A STEADY PLACE TO SLEEP OR SLEPT IN A SHELTER (INCLUDING NOW)?: NO

## 2022-08-30 SDOH — ECONOMIC STABILITY: FOOD INSECURITY: WITHIN THE PAST 12 MONTHS, YOU WORRIED THAT YOUR FOOD WOULD RUN OUT BEFORE YOU GOT MONEY TO BUY MORE.: NEVER TRUE

## 2022-08-30 SDOH — ECONOMIC STABILITY: INCOME INSECURITY: HOW HARD IS IT FOR YOU TO PAY FOR THE VERY BASICS LIKE FOOD, HOUSING, MEDICAL CARE, AND HEATING?: NOT VERY HARD

## 2022-08-30 SDOH — ECONOMIC STABILITY: HOUSING INSECURITY: IN THE LAST 12 MONTHS, HOW MANY PLACES HAVE YOU LIVED?: 1

## 2022-08-30 SDOH — ECONOMIC STABILITY: FOOD INSECURITY: WITHIN THE PAST 12 MONTHS, THE FOOD YOU BOUGHT JUST DIDN'T LAST AND YOU DIDN'T HAVE MONEY TO GET MORE.: NEVER TRUE

## 2022-08-30 SDOH — HEALTH STABILITY: MENTAL HEALTH
STRESS IS WHEN SOMEONE FEELS TENSE, NERVOUS, ANXIOUS, OR CAN'T SLEEP AT NIGHT BECAUSE THEIR MIND IS TROUBLED. HOW STRESSED ARE YOU?: RATHER MUCH

## 2022-08-30 SDOH — HEALTH STABILITY: PHYSICAL HEALTH: ON AVERAGE, HOW MANY DAYS PER WEEK DO YOU ENGAGE IN MODERATE TO STRENUOUS EXERCISE (LIKE A BRISK WALK)?: 3 DAYS

## 2022-08-30 SDOH — ECONOMIC STABILITY: TRANSPORTATION INSECURITY
IN THE PAST 12 MONTHS, HAS LACK OF RELIABLE TRANSPORTATION KEPT YOU FROM MEDICAL APPOINTMENTS, MEETINGS, WORK OR FROM GETTING THINGS NEEDED FOR DAILY LIVING?: NO

## 2022-08-30 SDOH — ECONOMIC STABILITY: TRANSPORTATION INSECURITY
IN THE PAST 12 MONTHS, HAS LACK OF TRANSPORTATION KEPT YOU FROM MEETINGS, WORK, OR FROM GETTING THINGS NEEDED FOR DAILY LIVING?: NO

## 2022-08-30 ASSESSMENT — SOCIAL DETERMINANTS OF HEALTH (SDOH)
DO YOU BELONG TO ANY CLUBS OR ORGANIZATIONS SUCH AS CHURCH GROUPS UNIONS, FRATERNAL OR ATHLETIC GROUPS, OR SCHOOL GROUPS?: NO
HOW OFTEN DO YOU HAVE A DRINK CONTAINING ALCOHOL: MONTHLY OR LESS
HOW OFTEN DO YOU ATTENT MEETINGS OF THE CLUB OR ORGANIZATION YOU BELONG TO?: PATIENT DECLINED
HOW OFTEN DO YOU ATTENT MEETINGS OF THE CLUB OR ORGANIZATION YOU BELONG TO?: PATIENT DECLINED
IN A TYPICAL WEEK, HOW MANY TIMES DO YOU TALK ON THE PHONE WITH FAMILY, FRIENDS, OR NEIGHBORS?: ONCE A WEEK
DO YOU BELONG TO ANY CLUBS OR ORGANIZATIONS SUCH AS CHURCH GROUPS UNIONS, FRATERNAL OR ATHLETIC GROUPS, OR SCHOOL GROUPS?: NO
IN A TYPICAL WEEK, HOW MANY TIMES DO YOU TALK ON THE PHONE WITH FAMILY, FRIENDS, OR NEIGHBORS?: ONCE A WEEK
HOW MANY DRINKS CONTAINING ALCOHOL DO YOU HAVE ON A TYPICAL DAY WHEN YOU ARE DRINKING: 1 OR 2
HOW OFTEN DO YOU GET TOGETHER WITH FRIENDS OR RELATIVES?: ONCE A WEEK
WITHIN THE PAST 12 MONTHS, YOU WORRIED THAT YOUR FOOD WOULD RUN OUT BEFORE YOU GOT THE MONEY TO BUY MORE: NEVER TRUE
HOW OFTEN DO YOU HAVE SIX OR MORE DRINKS ON ONE OCCASION: NEVER
HOW OFTEN DO YOU ATTEND CHURCH OR RELIGIOUS SERVICES?: NEVER
HOW HARD IS IT FOR YOU TO PAY FOR THE VERY BASICS LIKE FOOD, HOUSING, MEDICAL CARE, AND HEATING?: NOT VERY HARD
ARE YOU MARRIED, WIDOWED, DIVORCED, SEPARATED, NEVER MARRIED, OR LIVING WITH A PARTNER?: LIVING WITH PARTNER
HOW OFTEN DO YOU ATTEND CHURCH OR RELIGIOUS SERVICES?: NEVER
HOW OFTEN DO YOU GET TOGETHER WITH FRIENDS OR RELATIVES?: ONCE A WEEK
ARE YOU MARRIED, WIDOWED, DIVORCED, SEPARATED, NEVER MARRIED, OR LIVING WITH A PARTNER?: LIVING WITH PARTNER

## 2022-08-30 ASSESSMENT — LIFESTYLE VARIABLES
HOW OFTEN DO YOU HAVE SIX OR MORE DRINKS ON ONE OCCASION: NEVER
SKIP TO QUESTIONS 9-10: 1
HOW MANY STANDARD DRINKS CONTAINING ALCOHOL DO YOU HAVE ON A TYPICAL DAY: 1 OR 2
HOW OFTEN DO YOU HAVE A DRINK CONTAINING ALCOHOL: MONTHLY OR LESS
AUDIT-C TOTAL SCORE: 1